# Patient Record
Sex: FEMALE | URBAN - METROPOLITAN AREA
[De-identification: names, ages, dates, MRNs, and addresses within clinical notes are randomized per-mention and may not be internally consistent; named-entity substitution may affect disease eponyms.]

---

## 2018-09-28 ENCOUNTER — OFFICE VISIT (OUTPATIENT)
Dept: FAMILY MEDICINE CLINIC | Age: 43
End: 2018-09-28

## 2018-09-28 VITALS
HEART RATE: 64 BPM | WEIGHT: 159 LBS | TEMPERATURE: 98 F | SYSTOLIC BLOOD PRESSURE: 133 MMHG | DIASTOLIC BLOOD PRESSURE: 83 MMHG | BODY MASS INDEX: 33.23 KG/M2

## 2018-09-28 DIAGNOSIS — M25.561 ACUTE PAIN OF RIGHT KNEE: Primary | ICD-10-CM

## 2018-09-28 RX ORDER — NAPROXEN 500 MG/1
500 TABLET ORAL 2 TIMES DAILY WITH MEALS
Qty: 30 TAB | Refills: 0 | Status: SHIPPED | OUTPATIENT
Start: 2018-09-28

## 2018-09-28 RX ORDER — FLUOXETINE HYDROCHLORIDE 20 MG/1
CAPSULE ORAL
COMMUNITY

## 2018-09-28 NOTE — PATIENT INSTRUCTIONS
Inyecciones en rico articulación: Instrucciones de cuidado - [ Joint Injections: Care Instructions ]  Instrucciones de cuidado    Las inyecciones en rico articulación son inyecciones que se eryn en rico articulación, maite en la rodilla. Se utilizan para administrar medicamentos, tales maite analgésicos (medicamentos para el dolor). [place] inyección de corticosteroides, o esteroides, se [place] para reducir la [employer] tendones o las articulaciones. A menudo se [place] para tratar problemas maite la artritis, la tendinitis y la bursitis. Los esteroides pueden inyectarse directamente en rico articulación adolorida e inflamada. También pueden ayudar a reducir la inflamación de rico bursa. Rico bursa (o bolsa) es un saco lleno de líquido. Amortigua y [employer] tendones, los ligamentos, la piel, los músculos o los huesos se rozan entre sí. [place] inyección de esteroides a veces puede ayudar con el alivio del dolor a corto plazo cuando otros tratamientos no nguyen funcionado. Si las inyecciones de esteroides ayudan, el dolor podría aliviarse salena semanas o meses. La atención de seguimiento es rico parte clave de price tratamiento y seguridad. Asegúrese de hacer y acudir a todas las citas, y llame a price médico si está teniendo problemas. También es rico buena idea saber los resultados de paige exámenes y mantener rico lista de los medicamentos que melvin. ¿Cómo puede cuidarse en el hogar? · Aplíquese hielo o rico compresa fría en la dilia por entre 10 y 21 minutos cada vez. Póngase un paño newsome entre el hielo y la piel. · Pregúntele a price médico si puede ondina un analgésico de venta alysa, maite acetaminofén (Tylenol), ibuprofeno (Advil, Motrin) o naproxeno (Aleve). Sea elizabeth con los medicamentos. Deisy y siga todas las instrucciones de la [place]. · Evite las actividades intensas salena varios días. En particular, evite aquellas actividades que sobrecarguen la dilia en la que recibió la inyección.   · Si tiene vendajes sobre la dilia, manténgalos limpios y secos. Puede quitárselos cuando price médico se lo indique. ¿Cuándo debe pedir ayuda? Llame a price médico ahora mismo o busque atención médica inmediata si:    · Tiene señales de infección, tales maite:  ¨ Aumento del dolor, la hinchazón, la temperatura o el enrojecimiento. ¨ Vetas rojizas que salen de la dilia. ¨ Pus que sale de la dilia. Flaco Mano especial atención a los cambios en price lissa y asegúrese de comunicarse con price médico si tiene algún problema. ¿Dónde puede encontrar más información en inglés? Nicki Samano a [email or web address]-suyapa.[email or web address] [place] Marisa [id number] en la búsqueda para aprender más acerca de \"Inyecciones en rico articulación: Instrucciones de cuidado - [ Joint Injections: Care Instructions ]. \"  Revisado: 24 marzo, 2017  Versión del contenido: 11.7  © 4123-3407 Healthwise, Incorporated. Las instrucciones de cuidado fueron adaptadas bajo licencia por [employer] (which disclaims liability or warranty for this information). Si usted tiene [place] afección médica o sobre estas instrucciones, siempre pregunte a price profesional de lissa. Healthwise, Incorporated niega toda garantía o responsabilidad por price uso de esta información.

## 2018-09-28 NOTE — MR AVS SNAPSHOT
[address] [facility] [address] [place] [address] 
[phone] Patient: Jenny Fernandez MRN: [id number] Visit Information Angella Alejandra Personal Médico Departamento Teléfono del Dep. Número de visita 9/28/2018 11:00 AM Jeanette Oliveira 89, [place] [facility]- [address] [facility] [phone] Follow-up Instructions Return if symptoms worsen or fail to improve. Upcoming Health Maintenance Date Due DTaP/Tdap/Td series (1 - Tdap) 9/17/1996 PAP AKA CERVICAL CYTOLOGY 10/7/2014 Influenza Age 5 to Adult 8/1/2018 [place]  Review Complete El: 4/7/2014 Por: Jena Gunter RN  
 A partir del:  9/28/2018 No Known Allergies Vacunas actuales Sophia Scar No hay ninguna vacuna archivada. No revisadas esta visita You Were Diagnosed With   
  
 [facility] Acute pain of right knee    -  Primary ICD-10-CM: M25.561 ICD-9-CM: 719.46 Partes vitales PS Pulso Temperatura Peso (percentil de crecimiento) LMP (última ayesha) BMI (IMC)  
 133/83 (BP 1 Location: Left arm, BP Patient Position: Sitting) 64 98 °F (36.7 °C) (Oral) 159 lb (72.1 kg) 09/07/2018 33.23 kg/m2 Estado obstétrico Estatus de tabaquísmo Having regular periods Never Smoker Historial de signos vitales BMI and BSA Data Body Mass Index Body Surface Area  
 33.23 kg/m 2 1.72 m 2 [facility] Name Phone [address] [facility] [phone] Gonzalez lista de medicamentos actualizada Merary Allison actualizada 9/28/18 11:23 AM.  Darin Alvarez use gonzalez lista de medicamentos más reciente. FLUoxetine 20 mg capsule También conocido maite:  PROzac Take 40 mg by mouth daily. naproxen 500 mg tablet También conocido maite:  NAPROSYN  
 Take 1 Tab by mouth two (2) times daily (with meals). [place] [address] [employer] veces al ricardo con comida  
  
 triamcinolone acetonide 10 mg/mL injection También conocido maite:  KENALOG  
1 mL by Intra artICUlar route once for 1 dose. Recetas Enviado a la [place] Refills  
 naproxen (NAPROSYN) 500 mg tablet 0 Sig: Take 1 Tab by mouth two (2) times daily (with meals). [place] 1 [employer] veces al ricardo con comida Class: Normal  
 Pharmacy: [address] 1947 [address] Jiang  #: [phone] Route: Oral  
  
Hicimos lo siguiente TRIAMCINOLONE ACETONIDE INJ [ [facility]] Instrucciones de seguimiento Return if symptoms worsen or fail to improve. Instrucciones para el Paciente Inyecciones en rico articulación: Instrucciones de cuidado - [ Joint Injections: Care Instructions ] Instrucciones de cuidado Las inyecciones en rcio articulación son inyecciones que se eryn en rico articulación, maite en la rodilla. Se utilizan para administrar medicamentos, tales maite analgésicos (medicamentos para el dolor). [place] inyección de corticosteroides, o esteroides, se [place] para reducir la [employer] tendones o las articulaciones. A menudo se [place] para tratar problemas maite la artritis, la tendinitis y la bursitis. Los esteroides pueden inyectarse directamente en rico articulación adolorida e inflamada. También pueden ayudar a reducir la inflamación de rico bursa. Rico bursa (o bolsa) es un saco lleno de líquido. Amortigua y [employer] tendones, los ligamentos, la piel, los músculos o los huesos se rozan entre sí. [place] inyección de esteroides a veces puede ayudar con el alivio del dolor a corto plazo cuando otros tratamientos no nguyen funcionado. Si las inyecciones de esteroides ayudan, el dolor podría aliviarse salena semanas o meses.  
[place] de seguimiento es rico parte clave de price tratamiento y seguridad. Asegúrese de hacer y acudir a todas las citas, y llame a price médico si está teniendo problemas. También es rico buena idea saber los resultados de paige exámenes y mantener rico lista de los medicamentos que melvin. Cómo puede cuidarse en el hogar? · Aplíquese hielo o rico compresa fría en la dilia por entre 10 y 21 minutos cada vez. Póngase un paño newsome entre el hielo y la piel. · Pregúntele a price médico si puede ondina un analgésico de venta alysa, maite acetaminofén (Tylenol), ibuprofeno (Advil, Motrin) o naproxeno (Aleve). Sea elizabeth con los medicamentos. Deisy y siga todas las instrucciones de la [place]. · Evite las actividades intensas salena varios días. En particular, evite aquellas actividades que sobrecarguen la dilia en la que recibió la inyección. · Si tiene vendajes sobre la dilia, manténgalos limpios y secos. Puede quitárselos cuando price médico se lo indique. Cuándo debe pedir ayuda? Llame a price médico ahora mismo o busque atención médica inmediata si: 
  · Tiene señales de infección, tales maite: ¨ Aumento del dolor, la hinchazón, la temperatura o el enrojecimiento. ¨ Vetas rojizas que salen de la dilia. ¨ Pus que sale de la dilia. [place]ris Maple especial atención a los cambios en price lissa y asegúrese de comunicarse con price médico si tiene algún problema. Dónde puede encontrar más información en inglés? Diane Jeffery a [email or web address].[email or web address] Emily Teresa [phone] en la búsqueda para aprender más acerca de \"Inyecciones en rico articulación: Instrucciones de cuidado - [ Joint Injections: Care Instructions ]. \" 
Revisado: 24 marzo, 2017 Versión del contenido: 11.7 © 2429-7195 [employer]. Las instrucciones de cuidado fueron adaptadas bajo licencia por [employer] (which disclaims liability or warranty for this information).  Si usted tiene [place] afección médica o sobre estas instrucciones, siempre pregunte a price profesional de lissa. [employer] niega toda garantía o responsabilidad por price uso de esta información. Introducing [facility]! [facility] introduce portal paciente MyChart . Ahora se puede acceder a partes de price expediente médico, enviar por correo electrónico la oficina de price médico y solicitar renovaciones de medicamentos en línea. En price navegador de Internet , Nj Barriga a [email or web address] Luisa clic en el usuario por Tania Reid? Nguyen Cap clic aquí en la sesión Marc Bneson. Verá la página de registro [place]. Ingrese price código de [facility] Karen charis y maite aparece a continuación. Usted no tendrá que [employer] código después de diana completado el proceso de registro . Si usted no se inscribe antes de la fecha de caducidad , debe solicitar un nuevo código. · MyChart Código de acceso : [id number] Expires: 12/27/2018 11:22 AM 
 
[place] los últimos cuatro dígitos de price Número de Seguro Social ( xxxx ) y fecha de nacimiento ( dd / mm / aaaa ) maite se indica y luisa clic en Enviar. Usjeni será llevado a la siguiente página de registro . Crear un ID MyChart . Esta será price ID de inicio de sesión de MyChart y no puede ser [place] , por lo que pensar en rioc que es [place] y fácil de recordar . Crear rico contraseña MyChart . Usted puede cambiar price contraseña en cualquier momento . Ingrese price Password Reset de preguntas y [place] . [place] se puede utilizar en un momento posterior si usted olvida price contraseña. Introduzca price dirección de correo electrónico . Emanuel Santiago recibirá rico notificación por correo electrónico cuando la nueva información está disponible en MyChart . Rimma victoria en Registrarse. Sharif Lyleen cruzito y descargar porciones de price expediente médico. 
Luisa esme en el enlace de descarga del menú Resumen para descargar rico copia portátil de price información médica . Si tiene [employer] , por favor visite la sección de preguntas frecuentes del sitio web MyChart . Recuerde, MyChart NO es que se utilizará para las necesidades urgentes. Para emergencias médicas , llame al 911 . Ahora disponible en price iPhone y Android ! Por favor proporcione elizabeth resumen de la documentación de cuidado a price próximo proveedor. If you have any questions after today's visit, please call [phone].

## 2018-09-28 NOTE — PROGRESS NOTES
Coordination of Care  1. Have you been to the ER, urgent care clinic since your last visit? Hospitalized since your last visit? No    2. Have you seen or consulted any other health care providers outside of the [address] [place] [address] since your last visit? Include any pap smears or colon screening. No    Does the patient need refills? YES    Learning Assessment Complete?  yes

## 2018-09-28 NOTE — PROGRESS NOTES
Assessment/Plan:    Diagnoses and all orders for this visit:    1. Acute pain of right knee  -     TRIAMCINOLONE ACETONIDE INJ  -     triamcinolone acetonide (KENALOG) 10 mg/mL injection; 1 mL by Intra artICUlar route once for 1 dose.  -     naproxen (NAPROSYN) 500 mg tablet; Take 1 Tab by mouth two (2) times daily (with meals). [place] will al ricardo con comida    Pt has acute on chronic right knee pain, she is a pt at [facility] and has an ortho appt on 10/8/18. She presents today requesting a joint injection due to her severe right knee pain. On exam, there is no redness or warmth. She has no hx of gout. She has been on ibu 800 mg w/out any relief and she presents limping into the clinic due to the pain. She had an xray about 6 months ago and she was never given the results so she assumes it was \"okay\". The xray was not done in our system so I can not review it    After careful and thorough explanation of the risks and benefits of medical therapy versus injection, she asks me to please do the injection as the oral agents are not helping her. She signed a consent after the risk of infection, worsening of the pain, and unknown side effects was discussed. Follow-up Disposition:  Return if symptoms worsen or fail to improve. VALERIE Vaughan expressed understanding of this plan.  An AVS was printed and given to the patient.      ----------------------------------------------------------------------    Chief Complaint   Patient presents with    Knee Swelling       History of Present Illness:    Right knee pain, chronic pain but since yesterday she can't extend it or put pressure on it  PE shows no acute infection signs- no redness or warmth, she has mild swelling laterally pain is worse medial to the patella    Consent signed and witnessed by ANNIE Russell called right knee   Xylocaine !% exp 6/22 lot [id number] 2 cc mixed with 1 cc kenalog 10  Injected into medial joint space   Pt tolerated procedure well. She has improved mobility 10 min after injection  She left feeling improved     Instructions to rest, ice today as needed, take naprosyn and stop ibuprofen or mobic      She is [facility] by [facility] for left eye spasms, she is getting local injections (?botox) to help but she states that there has not been much improvement      Past Medical History:   Diagnosis Date    Depression        Current Outpatient Prescriptions   Medication Sig Dispense Refill    triamcinolone acetonide (KENALOG) 10 mg/mL injection 1 mL by Intra artICUlar route once for 1 dose. 1 Vial 0    naproxen (NAPROSYN) 500 mg tablet Take 1 Tab by mouth two (2) times daily (with meals). [place] 1 [employer] veces al ricardo con comida 30 Tab 0    FLUoxetine (PROZAC) 20 mg capsule Take 40 mg by mouth daily. No Known Allergies    Social History   Substance Use Topics    Smoking status: Never Smoker    Smokeless tobacco: Never Used    Alcohol use No       No family history on file.     Physical Exam:     Visit Vitals    /83 (BP 1 Location: Left arm, BP Patient Position: Sitting)    Pulse 64    Temp 98 °F (36.7 °C) (Oral)    Wt 159 lb (72.1 kg)    LMP 09/07/2018    BMI 33.23 kg/m2       A&Ox3  WDWN NAD  Respirations normal and non labored

## 2023-06-21 ENCOUNTER — HOSPITAL ENCOUNTER (OUTPATIENT)
Facility: HOSPITAL | Age: 48
Setting detail: SPECIMEN
Discharge: HOME | End: 2023-06-24

## 2023-06-21 PROCEDURE — 87801 DETECT AGNT MULT DNA AMPLI: CPT

## 2023-06-21 PROCEDURE — 87624 HPV HI-RISK TYP POOLED RSLT: CPT

## 2023-06-21 PROCEDURE — 87798 DETECT AGENT NOS DNA AMP: CPT

## 2023-06-21 PROCEDURE — 87661 TRICHOMONAS VAGINALIS AMPLIF: CPT

## 2023-06-21 PROCEDURE — 88175 CYTOPATH C/V AUTO FLUID REDO: CPT

## 2023-06-22 ENCOUNTER — HOSPITAL ENCOUNTER (OUTPATIENT)
Facility: HOSPITAL | Age: 48
Setting detail: SPECIMEN
Discharge: HOME | End: 2023-06-25

## 2023-06-26 LAB
A VAGINAE DNA VAG QL NAA+PROBE: NORMAL SCORE
BVAB2 DNA VAG QL NAA+PROBE: NORMAL SCORE
C ALBICANS DNA VAG QL NAA+PROBE: NEGATIVE
C GLABRATA DNA VAG QL NAA+PROBE: NEGATIVE
MEGA1 DNA VAG QL NAA+PROBE: NORMAL SCORE
SPECIMEN SOURCE: NORMAL
T VAGINALIS RRNA SPEC QL NAA+PROBE: NEGATIVE

## 2024-02-27 ENCOUNTER — HOSPITAL ENCOUNTER (OUTPATIENT)
Facility: HOSPITAL | Age: 49
Setting detail: SPECIMEN
Discharge: HOME | End: 2024-03-01

## 2024-02-27 ENCOUNTER — OFFICE VISIT (OUTPATIENT)
Age: 49
End: 2024-02-27

## 2024-02-27 VITALS
OXYGEN SATURATION: 97 % | DIASTOLIC BLOOD PRESSURE: 94 MMHG | HEIGHT: 58 IN | WEIGHT: 168.8 LBS | TEMPERATURE: 98.1 F | BODY MASS INDEX: 35.43 KG/M2 | HEART RATE: 72 BPM | SYSTOLIC BLOOD PRESSURE: 142 MMHG

## 2024-02-27 DIAGNOSIS — R35.0 URINARY FREQUENCY: Primary | ICD-10-CM

## 2024-02-27 DIAGNOSIS — I10 ESSENTIAL HYPERTENSION: ICD-10-CM

## 2024-02-27 DIAGNOSIS — R35.0 URINARY FREQUENCY: ICD-10-CM

## 2024-02-27 PROBLEM — G51.39 HEMIFACIAL SPASM: Status: ACTIVE | Noted: 2017-11-08

## 2024-02-27 LAB
BILIRUBIN, URINE, POC: NEGATIVE
BLOOD URINE, POC: NORMAL
GLUCOSE URINE, POC: NEGATIVE
KETONES, URINE, POC: NEGATIVE
LEUKOCYTE ESTERASE, URINE, POC: NORMAL
NITRITE, URINE, POC: NEGATIVE
PH, URINE, POC: 6.5 (ref 4.6–8)
PROTEIN,URINE, POC: NEGATIVE
SPECIFIC GRAVITY, URINE, POC: 1.02 (ref 1–1.03)
URINALYSIS CLARITY, POC: CLEAR
URINALYSIS COLOR, POC: YELLOW
UROBILINOGEN, POC: NORMAL

## 2024-02-27 PROCEDURE — 3080F DIAST BP >= 90 MM HG: CPT | Performed by: FAMILY MEDICINE

## 2024-02-27 PROCEDURE — 87086 URINE CULTURE/COLONY COUNT: CPT

## 2024-02-27 PROCEDURE — 36415 COLL VENOUS BLD VENIPUNCTURE: CPT

## 2024-02-27 PROCEDURE — 3077F SYST BP >= 140 MM HG: CPT | Performed by: FAMILY MEDICINE

## 2024-02-27 PROCEDURE — 99204 OFFICE O/P NEW MOD 45 MIN: CPT | Performed by: FAMILY MEDICINE

## 2024-02-27 PROCEDURE — 81002 URINALYSIS NONAUTO W/O SCOPE: CPT | Performed by: FAMILY MEDICINE

## 2024-02-27 RX ORDER — NITROFURANTOIN 25; 75 MG/1; MG/1
100 CAPSULE ORAL 2 TIMES DAILY
Qty: 14 CAPSULE | Refills: 0 | Status: SHIPPED | OUTPATIENT
Start: 2024-02-27 | End: 2024-03-05

## 2024-02-27 RX ORDER — HYDROCHLOROTHIAZIDE 25 MG/1
25 TABLET ORAL DAILY
COMMUNITY

## 2024-02-27 RX ORDER — NAPROXEN 500 MG/1
500 TABLET ORAL 2 TIMES DAILY WITH MEALS
COMMUNITY
Start: 2018-09-28

## 2024-02-27 RX ORDER — AMLODIPINE BESYLATE 5 MG/1
10 TABLET ORAL DAILY
COMMUNITY

## 2024-02-27 RX ORDER — FAMOTIDINE 40 MG/1
40 TABLET, FILM COATED ORAL DAILY
COMMUNITY

## 2024-02-27 RX ORDER — FLUCONAZOLE 150 MG/1
150 TABLET ORAL ONCE
Qty: 1 TABLET | Refills: 0 | Status: SHIPPED | OUTPATIENT
Start: 2024-02-27 | End: 2024-02-27

## 2024-02-27 RX ORDER — FLUOXETINE HYDROCHLORIDE 20 MG/1
20 CAPSULE ORAL DAILY
COMMUNITY

## 2024-02-27 SDOH — ECONOMIC STABILITY: HOUSING INSECURITY
IN THE LAST 12 MONTHS, WAS THERE A TIME WHEN YOU DID NOT HAVE A STEADY PLACE TO SLEEP OR SLEPT IN A SHELTER (INCLUDING NOW)?: NO

## 2024-02-27 SDOH — ECONOMIC STABILITY: FOOD INSECURITY: WITHIN THE PAST 12 MONTHS, THE FOOD YOU BOUGHT JUST DIDN'T LAST AND YOU DIDN'T HAVE MONEY TO GET MORE.: NEVER TRUE

## 2024-02-27 SDOH — ECONOMIC STABILITY: INCOME INSECURITY: HOW HARD IS IT FOR YOU TO PAY FOR THE VERY BASICS LIKE FOOD, HOUSING, MEDICAL CARE, AND HEATING?: NOT HARD AT ALL

## 2024-02-27 SDOH — ECONOMIC STABILITY: FOOD INSECURITY: WITHIN THE PAST 12 MONTHS, YOU WORRIED THAT YOUR FOOD WOULD RUN OUT BEFORE YOU GOT MONEY TO BUY MORE.: NEVER TRUE

## 2024-02-27 ASSESSMENT — PATIENT HEALTH QUESTIONNAIRE - PHQ9
SUM OF ALL RESPONSES TO PHQ QUESTIONS 1-9: 0
SUM OF ALL RESPONSES TO PHQ QUESTIONS 1-9: 0
2. FEELING DOWN, DEPRESSED OR HOPELESS: 0
SUM OF ALL RESPONSES TO PHQ QUESTIONS 1-9: 0
SUM OF ALL RESPONSES TO PHQ9 QUESTIONS 1 & 2: 0
1. LITTLE INTEREST OR PLEASURE IN DOING THINGS: 0
SUM OF ALL RESPONSES TO PHQ QUESTIONS 1-9: 0

## 2024-02-27 ASSESSMENT — ENCOUNTER SYMPTOMS
DIARRHEA: 0
SHORTNESS OF BREATH: 0
CONSTIPATION: 0
ABDOMINAL PAIN: 0
NAUSEA: 0
COUGH: 0

## 2024-02-27 NOTE — PROGRESS NOTES
Diana Villasenor seen at discharge. Full name and  verified; After visit Summary was given. RN reviewed today's visit with patient, as well as instructions on when it is recommended to return for follow-up visit in 4 weeks. RN reviewed the provider's instructions with the patient, answering all questions to her satisfaction. Patient verbalized understanding. Due to language barrier, an  (ID: [id number]) assisted during this encounter.   ROSS MONGE RN    
Results for orders placed or performed in visit on 02/27/24   AMB POC URINALYSIS DIP STICK MANUAL W/O MICRO   Result Value Ref Range    Color (UA POC) Yellow     Clarity (UA POC) Clear     Glucose, Urine, POC Negative     Bilirubin, Urine, POC Negative     Ketones, Urine, POC Negative     Specific Gravity, Urine, POC 1.020 1.001 - 1.035    Blood (UA POC) Trace     pH, Urine, POC 6.5 4.6 - 8.0    Protein, Urine, POC Negative     Urobilinogen, POC Normal     Nitrite, Urine, POC Negative     Leukocyte Esterase, Urine, POC Moderate            
        BP (!) 142/94 (Site: Left Upper Arm, Position: Sitting)   Pulse 72   Temp 98.1 °F (36.7 °C) (Temporal)   Ht 1.475 m (4' 10.07\")   Wt 76.6 kg (168 lb 12.8 oz)   LMP 02/13/2024 (Exact Date)   SpO2 97%   BMI 35.19 kg/m²     Physical Exam  Constitutional:       General: She is not in acute distress.     Appearance: Normal appearance.   HENT:      Mouth/Throat:      Mouth: Mucous membranes are moist.      Pharynx: Oropharynx is clear. No oropharyngeal exudate or posterior oropharyngeal erythema.   Eyes:      Extraocular Movements: Extraocular movements intact.      Conjunctiva/sclera: Conjunctivae normal.      Pupils: Pupils are equal, round, and reactive to light.   Cardiovascular:      Rate and Rhythm: Normal rate and regular rhythm.      Pulses: Normal pulses.      Heart sounds: Normal heart sounds.   Pulmonary:      Effort: Pulmonary effort is normal.      Breath sounds: Normal breath sounds.   Abdominal:      General: Bowel sounds are normal. There is no distension.      Palpations: Abdomen is soft.      Tenderness: There is abdominal tenderness (suprapubic). There is no right CVA tenderness, left CVA tenderness or guarding.   Musculoskeletal:      Cervical back: No tenderness.   Lymphadenopathy:      Cervical: No cervical adenopathy.   Neurological:      Mental Status: She is alert and oriented to person, place, and time.      Comments: Pt has facial twitching/spasms, right sided

## 2024-02-28 LAB
BACTERIA SPEC CULT: NORMAL
SERVICE CMNT-IMP: NORMAL

## 2024-03-29 ENCOUNTER — OFFICE VISIT (OUTPATIENT)
Age: 49
End: 2024-03-29

## 2024-03-29 VITALS
WEIGHT: 169 LBS | SYSTOLIC BLOOD PRESSURE: 151 MMHG | BODY MASS INDEX: 35.23 KG/M2 | OXYGEN SATURATION: 97 % | TEMPERATURE: 98.1 F | DIASTOLIC BLOOD PRESSURE: 95 MMHG | HEART RATE: 62 BPM

## 2024-03-29 DIAGNOSIS — I10 ESSENTIAL HYPERTENSION: Primary | ICD-10-CM

## 2024-03-29 PROCEDURE — 99213 OFFICE O/P EST LOW 20 MIN: CPT | Performed by: FAMILY MEDICINE

## 2024-03-29 PROCEDURE — 3077F SYST BP >= 140 MM HG: CPT | Performed by: FAMILY MEDICINE

## 2024-03-29 PROCEDURE — 3080F DIAST BP >= 90 MM HG: CPT | Performed by: FAMILY MEDICINE

## 2024-03-29 ASSESSMENT — ENCOUNTER SYMPTOMS
CONSTIPATION: 0
DIARRHEA: 0
NAUSEA: 0
COUGH: 0
SHORTNESS OF BREATH: 0
ABDOMINAL PAIN: 0

## 2024-03-29 NOTE — PROGRESS NOTES
Chief Complaint   Patient presents with    Hypertension    Urinary Tract Infection     Follow up     Patient name and date of birth verified.  Patient given an after visit summary, reviewed medication on how and when to take. It was iterated to patient to be sure to take blood pressure medication daily.  Patient will be following up at [facility].  Patient verbalized understanding of all information given at time of visit. Eleni Tucker LPN

## 2024-03-29 NOTE — PROGRESS NOTES
Diana Villasenor is a 48 y.o. female   Chief Complaint   Patient presents with   • Hypertension   • Urinary Tract Infection     Follow up         ASSESSMENT AND PLAN:    1. Essential hypertension  Uncontrolled off medications today with inconsistent medication use.  Patient counseled on the importance of controlling blood pressure and taking medications regularly.  She plans to continue care at [facility] and believes she has already had screenings done there.        SUBJECTIVE:    HPI:  Diana Villasenor is a 48 y.o. female who presents for UTI followup and BP check.  Urinary symptoms have resolved after diflucan and macrobid.  She has forgotten to take her medication today and often forgets to take them even though she has set an alarm.  She has noticed that sometimes her BP is normal even if she hasn't taken the meds.  She feels like certain food (like rice) increase her BP and maybe the naproxen for pain.    Has renewed paperwork at [facility] and plans to return there.  She just came here last month because of her UTI symptoms since she couldn't get an appointment there at the time.    Review of Systems   Constitutional:  Negative for fatigue, fever and unexpected weight change.   Eyes:  Negative for visual disturbance.   Respiratory:  Negative for cough and shortness of breath.    Cardiovascular:  Negative for chest pain and palpitations.   Gastrointestinal:  Negative for abdominal pain, constipation, diarrhea and nausea.   Genitourinary:  Negative for dysuria, frequency, urgency and vaginal discharge.   Neurological:  Negative for dizziness and headaches.       BP (!) 151/95 (Site: Left Upper Arm, Position: Sitting, Cuff Size: Medium Adult)   Pulse 62   Temp 98.1 °F (36.7 °C) (Temporal)   Wt 76.7 kg (169 lb)   SpO2 97%   BMI 35.23 kg/m²     Physical Exam  Constitutional:       General: She is not in acute distress.     Appearance: Normal appearance.   HENT:      Mouth/Throat:

## 2024-03-29 NOTE — PROGRESS NOTES
Chief Complaint   Patient presents with    Hypertension    Urinary Tract Infection     Follow up     BP (!) 151/95 (Site: Left Upper Arm, Position: Sitting, Cuff Size: Medium Adult)   Pulse 62   Temp 98.1 °F (36.7 °C) (Temporal)   Wt 76.7 kg (169 lb)   SpO2 97%   BMI 35.23 kg/m²   \"Have you been to the ER, urgent care clinic since your last visit?  Hospitalized since your last visit?\"    NO    “Have you seen or consulted any other health care providers outside of [facility] since your last visit?”    NO        “Have you had a colorectal cancer screening such as a colonoscopy/FIT/Cologuard?    NO    No colonoscopy on file  No cologuard on file  No FIT/FOBT on file   No flexible sigmoidoscopy on file         Click Here for Release of Records Request